# Patient Record
Sex: FEMALE | Race: WHITE
[De-identification: names, ages, dates, MRNs, and addresses within clinical notes are randomized per-mention and may not be internally consistent; named-entity substitution may affect disease eponyms.]

---
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
## 2018-04-04 ENCOUNTER — HOSPITAL ENCOUNTER (EMERGENCY)
Dept: HOSPITAL 25 - UCCORT | Age: 18
Discharge: HOME | End: 2018-04-04
Payer: COMMERCIAL

## 2018-04-04 VITALS — DIASTOLIC BLOOD PRESSURE: 67 MMHG | SYSTOLIC BLOOD PRESSURE: 111 MMHG

## 2018-04-04 DIAGNOSIS — F41.9: ICD-10-CM

## 2018-04-04 DIAGNOSIS — R00.2: Primary | ICD-10-CM

## 2018-04-04 PROCEDURE — G0463 HOSPITAL OUTPT CLINIC VISIT: HCPCS

## 2018-04-04 PROCEDURE — 99211 OFF/OP EST MAY X REQ PHY/QHP: CPT

## 2018-04-04 NOTE — UC
General HPI





- HPI Summary


HPI Summary: 





For the past few weeks she has been getting flushed, palpitations and "worked up

" when she is in crowds or being called up on class. She denies cardiac 

ischemic equivalents, syncope, swelling, PND, orthopnea. She has had no 

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 

intolerance, syncope. Her mother tells her that she has had a murmur since 

childhood. Recently she has been concerned that she is only eating fast food 

and not excersizing enough. Sexually active only with condoms and currently is 

on her period. Denies drugs or smoking. Etoh on the weekends alone. 





- History of Current Complaint


Chief Complaint: UCGeneralIllness


Stated Complaint: PALPITATIONS


Time Seen by Provider: 04/04/18 13:05


Hx Obtained From: Patient


Hx Last Menstrual Period: 3/31/18


Onset/Duration: Gradual Onset, Lasting Minutes


Timing: Constant


Onset Severity: Moderate


Current Severity: None


Pain Intensity: 3


Associated Signs & Symptoms: Positive: Palpitations.  Negative: Abdominal Pain, 

Confusion, Cough, Chest Pain, Decreased Responsiveness, Dizziness, Diarrhea, 

Dysuria, Fever, Headache, Nausea, Recent Medication Changes, Syncope, SOB





- Allergy/Home Medications


Allergies/Adverse Reactions: 


 Allergies











Allergy/AdvReac Type Severity Reaction Status Date / Time


 


No Known Allergies Allergy   Verified 04/04/18 13:01











Home Medications: 


 Home Medications





Fluconazole [Diflucan 150 MG (NF)] 150 mg PO ONCE 04/04/18 [History Confirmed 04 /04/18]


metroNIDAZOLE TAB* [Flagyl 250 mg TAB*] 500 mg PO BID 04/04/18 [History 

Confirmed 04/04/18]











PMH/Surg Hx/FS Hx/Imm Hx


Previously Healthy: Yes





- Surgical History


Surgical History: None





- Family History


Known Family History: Positive: None





- Social History


Occupation: Student


Alcohol Use: Weekly


Substance Use Type: None


Smoking Status (MU): Never Smoked Tobacco


Have You Smoked in the Last Year: No





- Immunization History


Most Recent Influenza Vaccination: no





Review of Systems


Cardiovascular: Palpitations


All Other Systems Reviewed And Are Negative: Yes





Physical Exam


Triage Information Reviewed: Yes


Appearance: Well-Appearing, No Pain Distress, Well-Nourished


Vital Signs: 


 Initial Vital Signs











Temp  98.4 F   04/04/18 13:04


 


Pulse  72   04/04/18 13:04


 


Resp  18   04/04/18 13:04


 


BP  111/67   04/04/18 13:04


 


Pulse Ox  99   04/04/18 13:04











Vital Signs Reviewed: Yes


Eyes: Positive: Conjunctiva Clear.  Negative: Conjunctiva Inflamed


ENT: Positive: Hearing grossly normal, Pharynx normal, TMs normal, Uvula midline


Neck: Positive: Supple, Nontender, No Lymphadenopathy


Respiratory: Positive: Lungs clear, Normal breath sounds, No respiratory 

distress, No accessory muscle use.  Negative: Respiratory distress, Decreased 

breath sounds, Accessory muscle use, Crackles, Rhonchi, Stridor, Wheezing


Cardiovascular Exam: Other - 1/6 holosystolic murmur over the left 3rd 

intercostal space.


Cardiovascular: Positive: RRR, Pulses Normal, Brisk Capillary Refill


Abdomen Description: Positive: No Organomegaly, Soft.  Negative: Distended, 

Guarding


Musculoskeletal: Positive: Strength Intact, ROM Intact, No Edema


Neurological: Positive: Alert, Muscle Tone Normal.  Negative: Fatigued


Psychological: Positive: Age Appropriate Behavior


Skin: Negative: rashes





Course/Dx





- Course


Course Of Treatment: We have considered drug use but there is no stigmata of 

use. She adamantly denies it. We have considered serious cardiac disease but 

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 

and does not have any syncope. THese symptoms only occur when she is onthe spot 

in crowds or in class when called upon. She agrees to speak with mother and 

with student health to consider counselling. She denies depression or suicidal 

thoughts without hesitation.





- Differential Dx - Multi-Symptom


Provider Diagnoses: palpitations.  anxiety.





Discharge





- Sign-Out/Discharge


Documenting (check all that apply): Discharge





- Discharge Plan


Condition: Good


Disposition: HOME


Patient Education Materials:  Heart Palpitations (ED)


Referrals: 


Non Staff,Doctor [Primary Care Provider] - 


Additional Instructions: 


Discuss your concerns with parents and student health to come up with a plan 

about possible stress relief, counselling. 





- Billing Disposition and Condition


Condition: GOOD


Disposition: HOME

## 2018-09-05 ENCOUNTER — HOSPITAL ENCOUNTER (EMERGENCY)
Dept: HOSPITAL 25 - UCCORT | Age: 18
Discharge: HOME | End: 2018-09-05
Payer: COMMERCIAL

## 2018-09-05 VITALS — DIASTOLIC BLOOD PRESSURE: 74 MMHG | SYSTOLIC BLOOD PRESSURE: 119 MMHG

## 2018-09-05 DIAGNOSIS — N89.8: Primary | ICD-10-CM

## 2018-09-05 PROCEDURE — 81003 URINALYSIS AUTO W/O SCOPE: CPT

## 2018-09-05 PROCEDURE — 84702 CHORIONIC GONADOTROPIN TEST: CPT

## 2018-09-05 PROCEDURE — G0463 HOSPITAL OUTPT CLINIC VISIT: HCPCS

## 2018-09-05 PROCEDURE — 87510 GARDNER VAG DNA DIR PROBE: CPT

## 2018-09-05 PROCEDURE — 87491 CHLMYD TRACH DNA AMP PROBE: CPT

## 2018-09-05 PROCEDURE — 99212 OFFICE O/P EST SF 10 MIN: CPT

## 2018-09-05 PROCEDURE — 87480 CANDIDA DNA DIR PROBE: CPT

## 2018-09-05 PROCEDURE — 87591 N.GONORRHOEAE DNA AMP PROB: CPT

## 2018-09-05 NOTE — ED
GI/ HPI





- HPI Summary


HPI Summary: 





18 yr old with complaint of a brownish vaginal discharge that began two days 

ago and with slight feeling of itching.  No itching or irritation today.  No 

abdominal, pelvic or flank pain.  No fever or chills.   The patient denies NVD.

  





She has had two sexual partners in the past two months.  She had sex last four 

days ago. 





She denies prior STD.  Denies prior pregnancies. 





- History of Current Complaint


Chief Complaint: UCGU


Time Seen by Provider: 09/05/18 21:08


Stated Complaint: PERSONAL


Hx Last Menstrual Period: 8/20/18 on BCP


Pain Intensity: 0





- Allergy/Home Medications


Allergies/Adverse Reactions: 


 Allergies











Allergy/AdvReac Type Severity Reaction Status Date / Time


 


No Known Allergies Allergy   Verified 09/05/18 20:57














PMH/Surg Hx/FS Hx/Imm Hx


Infectious Disease History: No


Infectious Disease History: 


   Denies: Traveled Outside the [place] in Last 30 Days





- Family History


Known Family History: Positive: None





- Social History


Occupation: Student


Lives: Dormitory/Roommates


Alcohol Use: Weekly


Alcohol Amount: weekends


Substance Use Type: Reports: None


Smoking Status (MU): Never Smoked Tobacco


Have You Smoked in the Last Year: No





Review of Systems


Constitutional: Negative


Negative: Abdominal Pain


Positive: discharge


All Other Systems Reviewed And Are Negative: Yes





Physical Exam


Triage Information Reviewed: Yes


Vital Signs On Initial Exam: 


 Initial Vitals











Temp Pulse Resp BP Pulse Ox


 


 97.8 F   80   15   119/74   99 


 


 09/05/18 20:53  09/05/18 20:53  09/05/18 20:53  09/05/18 20:53  09/05/18 20:53











Vital Signs Reviewed: Yes


Appearance: Positive: Well-Appearing, No Pain Distress


Skin: Positive: Warm, Skin Color Reflects Adequate Perfusion


Head/Face: Positive: Normal Head/Face Inspection


Eyes: Positive: EOMI


ENT: Positive: Normal ENT inspection


Neck: Positive: Nontender


Respiratory/Lung Sounds: Positive: Clear to Auscultation, Breath Sounds Present


Cardiovascular: Positive: RRR.  Negative: Murmur


Abdomen Description: Positive: Nontender.  Negative: CVA Tenderness (R), CVA 

Tenderness (L)


Pelvic Exam: Positive: External Exam Normal, Bimanual Exam Normal, No Cerv. 

Motion Tender, Discharge - scant yellowish discharge with mild maloderous 

smell..  Negative: Active Bleeding, Mass, Tender Adnexa, Tender Uterus, Ulcers


Neurological: Positive: Sensory/Motor Intact, Alert, Oriented to Person Place, 

Time, CN Intact II-III, Normal Gait, Speech Normal


Psychiatric: Positive: Normal


AVPU Assessment: Alert





- [place] Coma Scale


Best Eye Response: 4 - Spontaneous


Best Motor Response: 6 - Obeys Commands


Best Verbal Response: 5 - Oriented


Coma Scale Total: 15





Diagnostics





- Vital Signs


 Vital Signs











  Temp Pulse Resp BP Pulse Ox


 


 09/05/18 20:53  97.8 F  80  15  119/74  99














- Laboratory


Lab Results: 


 Lab Results











  09/05/18 Range/Units





  21:34 


 


POC Urine Color  Yellow  


 


POC Urine Clarity  Turbid  


 


POC Urine pH  6.5  (5-9)  


 


POC Ur Specif Gravity  1.025  (1.010-1.030)  


 


POC Urine Protein  Negative  (Negative)  


 


POC Ur Glucose (UA)  Negative  (Negative)  


 


POC Urine Ketones  Negative  (Negative)  


 


POC Urine Blood  Negative  (Negative)  


 


POC Urine Nitrite  Negative  (Negative)  


 


POC Urine Bilirubin  Negative  (Negative)  


 


POC Urine Urobilinogen  1.0  (Negative)  


 


POC U Leukocyte Esteras  Negative  (Negative)  











Lab Statement: Any lab studies that have been ordered have been reviewed, and 

results considered in the medical decision making process.





GIGU Course/Dx





- Course


Course Of Treatment: 18 yr old female with vaginal discharge.  Plan DC home.  

She requests not to be treated until her vaginal and cervical test come back.





- Diagnoses


Provider Diagnoses: 


 Vaginal discharge








Discharge





- Sign-Out/Discharge


Documenting (check all that apply): Patient Departure


All imaging exams completed and their final reports reviewed: No Studies





- Discharge Plan


Condition: Good


Disposition: HOME


Patient Education Materials:  Vaginal Discharge (ED)


Referrals: 


Non Staff,Doctor [Primary Care Provider] - 


[facility] [Outside] - 2 Days


[facility] [place] PHYSICIAN REFERRAL [Outside] - 2 Days





- Billing Disposition and Condition


Condition: GOOD


Disposition: Home

## 2025-09-17 ENCOUNTER — NON-APPOINTMENT (OUTPATIENT)
Age: 25
End: 2025-09-17

## 2025-09-19 ENCOUNTER — APPOINTMENT (OUTPATIENT)
Dept: OBGYN | Facility: CLINIC | Age: 25
End: 2025-09-19

## 2025-09-19 ENCOUNTER — LABORATORY RESULT (OUTPATIENT)
Age: 25
End: 2025-09-19

## 2025-09-19 VITALS
SYSTOLIC BLOOD PRESSURE: 122 MMHG | BODY MASS INDEX: 23.59 KG/M2 | HEIGHT: 63 IN | DIASTOLIC BLOOD PRESSURE: 60 MMHG | WEIGHT: 133.13 LBS

## 2025-09-19 DIAGNOSIS — Z30.09 ENCOUNTER FOR OTHER GENERAL COUNSELING AND ADVICE ON CONTRACEPTION: ICD-10-CM

## 2025-09-19 DIAGNOSIS — Z78.9 OTHER SPECIFIED HEALTH STATUS: ICD-10-CM

## 2025-09-19 DIAGNOSIS — Z01.419 ENCOUNTER FOR GYNECOLOGICAL EXAMINATION (GENERAL) (ROUTINE) W/OUT ABNORMAL FINDINGS: ICD-10-CM

## 2025-09-19 PROBLEM — Z00.00 ENCOUNTER FOR PREVENTIVE HEALTH EXAMINATION: Status: ACTIVE | Noted: 2025-09-19

## 2025-09-19 RX ORDER — NORETHINDRONE ACETATE AND ETHINYL ESTRADIOL AND FERROUS FUMARATE 1MG-20(21)
1-20 KIT ORAL DAILY
Qty: 3 | Refills: 1 | Status: ACTIVE | COMMUNITY
Start: 2025-09-19 | End: 1900-01-01

## 2025-09-22 LAB — HPV HIGH+LOW RISK DNA PNL CVX: NOT DETECTED

## 2025-09-25 LAB — CYTOLOGY CVX/VAG DOC THIN PREP: ABNORMAL
